# Patient Record
Sex: MALE | Race: WHITE
[De-identification: names, ages, dates, MRNs, and addresses within clinical notes are randomized per-mention and may not be internally consistent; named-entity substitution may affect disease eponyms.]

---

## 2019-06-04 ENCOUNTER — HOSPITAL ENCOUNTER (OUTPATIENT)
Dept: HOSPITAL 50 - VM.SDS | Age: 64
Discharge: HOME | End: 2019-06-04
Attending: SURGERY
Payer: COMMERCIAL

## 2019-06-04 DIAGNOSIS — D12.5: ICD-10-CM

## 2019-06-04 DIAGNOSIS — M19.90: ICD-10-CM

## 2019-06-04 DIAGNOSIS — D12.4: ICD-10-CM

## 2019-06-04 DIAGNOSIS — Z12.11: Primary | ICD-10-CM

## 2019-06-04 DIAGNOSIS — Z79.82: ICD-10-CM

## 2019-06-04 DIAGNOSIS — K57.30: ICD-10-CM

## 2019-06-04 DIAGNOSIS — E78.00: ICD-10-CM

## 2019-06-04 DIAGNOSIS — Z79.899: ICD-10-CM

## 2019-06-04 PROCEDURE — 45385 COLONOSCOPY W/LESION REMOVAL: CPT

## 2019-06-04 NOTE — OR
PREOPERATIVE DIAGNOSIS:  Screening colonoscopy.

 

POSTOPERATIVE DIAGNOSES:  Pancolonic diverticulosis, colonic polyps x2.

 

PROCEDURE PROPOSED:  Total flexible colonoscopy.

 

PROCEDURE DONE:  Total flexible colonoscopy with hot snare polypectomy x2.

 

INDICATION:  This is a 63-year-old gentleman who comes in for screening

colonoscopy.  His last examination was 10 to 11 years ago.  He denies any

symptomatology and has a negative family history of any colon cancer.

 

TECHNIQUE:  The patient was brought to the endoscopy suite, placed in left

lateral decubitus position.  He was sedated per CRNA with propofol.  The

flexible video colonoscope was then passed transanally and under visualization

advanced to the cecum.  He had quite a bit of diverticulosis throughout his

entire colon, but was most marked in the sigmoid region.  Otherwise, his

ascending, transverse colon are unremarkable.  In the descending colon at about

60 cm, there was a small polyp removed by hot snare polypectomy technique and

retrieved with suction.  He was then noted to have moderate diverticulosis again

through the sigmoid and at 20 cm, there was a larger adenomatous polyp removed

by hot snare polypectomy technique and retrieved with suction, and the remainder

of the rectosigmoid was normal as the scope was then withdrawn.  He tolerated

the procedure well.

 

FINAL IMPRESSION:

1. Colonic polyps x2 removed.

2. Pancolonic diverticulosis.

 

PLAN:  The patient will be sent a letter with pathology report.  I felt that he

should have a 5-year followup exam because of the finding of the polyps.

 

 

SCM:  06/04/2019 11:13:13  MODL:  06/04/2019 11:26:48

Job #:  [id number]

## 2024-10-10 ENCOUNTER — HOSPITAL ENCOUNTER (OUTPATIENT)
Dept: HOSPITAL 50 - VM.SDS | Age: 69
Discharge: HOME | End: 2024-10-10
Attending: FAMILY MEDICINE
Payer: MEDICARE

## 2024-10-10 DIAGNOSIS — Z86.0100: ICD-10-CM

## 2024-10-10 DIAGNOSIS — D12.6: ICD-10-CM

## 2024-10-10 DIAGNOSIS — Z79.82: ICD-10-CM

## 2024-10-10 DIAGNOSIS — N40.1: ICD-10-CM

## 2024-10-10 DIAGNOSIS — E78.00: ICD-10-CM

## 2024-10-10 DIAGNOSIS — Z12.11: Primary | ICD-10-CM

## 2024-10-10 DIAGNOSIS — K64.4: ICD-10-CM

## 2024-10-10 DIAGNOSIS — K57.30: ICD-10-CM

## 2024-10-10 DIAGNOSIS — N18.2: ICD-10-CM

## 2024-10-10 DIAGNOSIS — Z79.899: ICD-10-CM
